# Patient Record
Sex: FEMALE | Race: WHITE | NOT HISPANIC OR LATINO | URBAN - NONMETROPOLITAN AREA
[De-identification: names, ages, dates, MRNs, and addresses within clinical notes are randomized per-mention and may not be internally consistent; named-entity substitution may affect disease eponyms.]

---

## 2020-08-06 ENCOUNTER — IMPORTED ENCOUNTER (OUTPATIENT)
Dept: URBAN - NONMETROPOLITAN AREA CLINIC 1 | Facility: CLINIC | Age: 55
End: 2020-08-06

## 2020-08-06 PROBLEM — H43.811: Noted: 2020-08-06

## 2020-08-06 PROCEDURE — 99203 OFFICE O/P NEW LOW 30 MIN: CPT

## 2020-08-06 NOTE — PATIENT DISCUSSION
PPVD OD-  discussed findings w/patient-  Retina flat 360 with no breaks tears or heme. -  S&S of RD/RT reviewed with pt. -  Stressed that pt should contact our office right away with any changes or increase in symptoms-  RTC PRN here have asked patient to f/u at home w/in 2 weeks

## 2022-04-09 ASSESSMENT — TONOMETRY
OD_IOP_MMHG: 14
OS_IOP_MMHG: 14